# Patient Record
Sex: MALE | Race: WHITE | NOT HISPANIC OR LATINO | Employment: UNEMPLOYED | URBAN - NONMETROPOLITAN AREA
[De-identification: names, ages, dates, MRNs, and addresses within clinical notes are randomized per-mention and may not be internally consistent; named-entity substitution may affect disease eponyms.]

---

## 2022-03-30 ENCOUNTER — OFFICE VISIT (OUTPATIENT)
Dept: FAMILY MEDICINE | Facility: CLINIC | Age: 22
End: 2022-03-30

## 2022-03-30 VITALS
SYSTOLIC BLOOD PRESSURE: 138 MMHG | DIASTOLIC BLOOD PRESSURE: 90 MMHG | OXYGEN SATURATION: 97 % | WEIGHT: 208 LBS | TEMPERATURE: 99 F | RESPIRATION RATE: 18 BRPM | HEART RATE: 83 BPM | HEIGHT: 72 IN | BODY MASS INDEX: 28.17 KG/M2

## 2022-03-30 DIAGNOSIS — I10 PRIMARY HYPERTENSION: Primary | ICD-10-CM

## 2022-03-30 PROCEDURE — 99202 OFFICE O/P NEW SF 15 MIN: CPT | Mod: ,,, | Performed by: NURSE PRACTITIONER

## 2022-03-30 PROCEDURE — 99202 PR OFFICE/OUTPT VISIT, NEW, LEVL II, 15-29 MIN: ICD-10-PCS | Mod: ,,, | Performed by: NURSE PRACTITIONER

## 2022-03-30 NOTE — PROGRESS NOTES
3/30/2022     JOSE Hackett   [facility]  [address]  [place] [address]     PATIENT NAME: Jose Jacobs  : 2000  DATE: 3/30/22  MRN: [id number]      Billing Provider: JOSE Hackett  Level of Service:   Patient PCP Information     Provider PCP Type    JOSE Hackett General          Reason for Visit / Chief Complaint: Hypertension (Has hx of high blood pressure but has never been diagnosed, says its ran high for years, head ache this am and dizzy, checked it at work and it was 197/110, took some of his boss's metoprolol 25 mg )       Update PCP  Update Chief Complaint         History of Present Illness / Problem Focused Workflow     htn dizzy lightheaded and headache bp was 197/110 took metoprolol 25mg improved symptoms  Jose Jacobs presents to the clinic       Review of Systems     Review of Systems   Constitutional: Negative for activity change, appetite change, chills, fatigue and fever.   HENT: Negative for dental problem, drooling, ear pain, mouth sores, nosebleeds, sinus pressure/congestion, sore throat and trouble swallowing.    Eyes: Negative for visual disturbance.   Respiratory: Negative for cough, chest tightness, shortness of breath and wheezing.    Cardiovascular: Negative for chest pain, palpitations and leg swelling.   Gastrointestinal: Negative for abdominal pain, change in bowel habit, diarrhea, nausea, vomiting and change in bowel habit.   Genitourinary: Negative for difficulty urinating.   Integumentary:  Negative for pallor and rash.   Neurological: Positive for light-headedness and headaches. Negative for syncope, facial asymmetry, weakness, numbness, disturbances in coordination and coordination difficulties.        Medical / Social / Family History   History reviewed. No pertinent past medical history.    Past Surgical History:   Procedure Laterality Date    ADENOIDECTOMY      HERNIA REPAIR      TONSILLECTOMY          Social History    reports that he has never smoked. His smokeless tobacco use includes snuff. He reports current alcohol use. He reports that he does not use drugs.    Family History  's family history includes Diabetes in his maternal grandmother and paternal grandmother; Hypertension in his father.    Medications and Allergies     Medications  No outpatient medications have been marked as taking for the 3/30/22 encounter (Office Visit) with JOSE Hackett.       Allergies  Review of patient's allergies indicates:   Allergen Reactions    Pcn [penicillins] Swelling       Physical Examination     Vitals:    03/30/22 1352   BP: (!) 138/90   BP Location: Left arm   Patient Position: Sitting   BP Method: Medium (Manual)   Pulse: 83   Resp: 18   Temp: 98.7 °F (37.1 °C)   TempSrc: Oral   SpO2: 97%   Weight: 94.3 kg (208 lb)   Height: 6' (1.829 m)      Physical Exam  Vitals and nursing note reviewed.   Constitutional:       General: He is not in acute distress.     Appearance: Normal appearance. He is not ill-appearing or toxic-appearing.   HENT:      Head: Normocephalic.      Right Ear: External ear normal.      Left Ear: External ear normal.      Nose: Nose normal.      Mouth/Throat:      Mouth: Mucous membranes are moist.   Eyes:      Conjunctiva/sclera: Conjunctivae normal.      Pupils: Pupils are equal, round, and reactive to light.   Cardiovascular:      Rate and Rhythm: Normal rate and regular rhythm.      Heart sounds: Normal heart sounds.   Pulmonary:      Effort: Pulmonary effort is normal.      Breath sounds: Normal breath sounds.   Musculoskeletal:         General: Normal range of motion.      Cervical back: Normal range of motion and neck supple. No rigidity or tenderness.   Lymphadenopathy:      Cervical: No cervical adenopathy.   Skin:     General: Skin is warm.      Capillary Refill: Capillary refill takes less than 2 seconds.   Neurological:      Mental Status: He is alert and  oriented to person, place, and time.   Psychiatric:         Behavior: Behavior normal.          Assessment and Plan (including Health Maintenance)      Problem List  Smart Sets  Document Outside HM   :    Plan:   Start metoprolol 25 mg bid return next week for blood pressure check if normal and tolerating med will send refill - work on diet, specifically cutting back bread, breaded things, cereal, pasta, potatoes, rice  - try to exercise at least 150min a week divided however you want  - if you can do weight, even very light weight do them  - try not to use to much salt, if any, remember that things that are preserved or frozen often contain large amounts of salt as a preserve      Health Maintenance Due   Topic Date Due    Hepatitis C Screening  Never done    Lipid Panel  Never done    COVID-19 Vaccine (1) Never done    HPV Vaccines (1 - Male 2-dose series) Never done    HIV Screening  Never done    TETANUS VACCINE  Never done    Influenza Vaccine (1) Never done       Problem List Items Addressed This Visit    None     Visit Diagnoses     Primary hypertension    -  Primary        Primary hypertension       The patient has no Health Maintenance topics of status Not Due    Procedures     Future Appointments   Date Time Provider Department Center   4/6/2022  1:00 PM JOSE Hackett [facility]   6/30/2022  1:30 PM JOSE Hackett [facility]        Follow up in about 3 months (around 6/30/2022).     Signature:  JOSE Hackett    Date of encounter: 3/30/22

## 2022-03-30 NOTE — PATIENT INSTRUCTIONS
- work on diet, specifically cutting back bread, breaded things, cereal, pasta, potatoes, rice  - try to exercise at least 150min a week divided however you want  - if you can do weight, even very light weight do them  - try not to use to much salt, if any, remember that things that are preserved or frozen often contain large amounts of salt as a preserve     Return next week for nurse document blood pressure check- discussed take metoprolol 25 mg twice daily

## 2022-04-06 ENCOUNTER — CLINICAL SUPPORT (OUTPATIENT)
Dept: FAMILY MEDICINE | Facility: CLINIC | Age: 22
End: 2022-04-06

## 2022-04-06 VITALS — DIASTOLIC BLOOD PRESSURE: 78 MMHG | SYSTOLIC BLOOD PRESSURE: 138 MMHG

## 2022-04-06 DIAGNOSIS — I10 PRIMARY HYPERTENSION: Primary | ICD-10-CM

## 2022-04-06 RX ORDER — METOPROLOL TARTRATE 25 MG/1
25 TABLET, FILM COATED ORAL 2 TIMES DAILY
Qty: 60 TABLET | Refills: 5 | Status: SHIPPED | OUTPATIENT
Start: 2022-04-06 | End: 2023-04-06

## 2023-10-23 ENCOUNTER — LAB VISIT (OUTPATIENT)
Dept: PRIMARY CARE CLINIC | Facility: CLINIC | Age: 23
End: 2023-10-23

## 2023-10-23 DIAGNOSIS — Z02.83 ENCOUNTER FOR DRUG SCREENING: Primary | ICD-10-CM

## 2023-10-23 PROCEDURE — 99000 SPECIMEN HANDLING OFFICE-LAB: CPT | Mod: ,,, | Performed by: NURSE PRACTITIONER

## 2023-10-23 PROCEDURE — 99000 PR SPECIMEN HANDLING,DR OFF->LAB: ICD-10-PCS | Mod: ,,, | Performed by: NURSE PRACTITIONER

## 2023-10-23 NOTE — PROGRESS NOTES
Subjective     Patient ID: Jose Jacobs is a 23 y.o. male.    Chief Complaint: No chief complaint on file.    HPI  Review of Systems       Objective     Physical Exam       Assessment and Plan     1. Encounter for drug screening        Drug testing only           No follow-ups on file.